# Patient Record
Sex: FEMALE | Race: WHITE
[De-identification: names, ages, dates, MRNs, and addresses within clinical notes are randomized per-mention and may not be internally consistent; named-entity substitution may affect disease eponyms.]

---

## 2017-03-08 ENCOUNTER — HOSPITAL ENCOUNTER (OUTPATIENT)
Dept: HOSPITAL 45 - C.LABPVFM | Age: 57
Discharge: HOME | End: 2017-03-08
Attending: INTERNAL MEDICINE
Payer: COMMERCIAL

## 2017-03-08 DIAGNOSIS — Z13.220: Primary | ICD-10-CM

## 2017-03-08 LAB
CHOLEST/HDLC SERPL: 2.9 {RATIO}
GLUCOSE UR QL: 53 MG/DL
KETONES UR QL STRIP: 92 MG/DL
NITRITE UR QL STRIP: 55 MG/DL (ref 0–150)
PH UR: 156 MG/DL (ref 0–200)
VERY LOW DENSITY LIPOPROT CALC: 11 MG/DL

## 2017-04-11 ENCOUNTER — HOSPITAL ENCOUNTER (OUTPATIENT)
Dept: HOSPITAL 45 - C.MAMM | Age: 57
Discharge: HOME | End: 2017-04-11
Attending: OBSTETRICS & GYNECOLOGY
Payer: COMMERCIAL

## 2017-04-11 DIAGNOSIS — Z12.31: Primary | ICD-10-CM

## 2017-04-12 NOTE — MAMMOGRAPHY REPORT
BILATERAL DIGITAL SCREENING MAMMOGRAM TOMOSYNTHESIS WITH CAD: 4/11/2017

CLINICAL HISTORY: Routine screening.  Patient has no complaints.  





TECHNIQUE:  Breast tomosynthesis in addition to standard 2D mammography was performed. Current study
 was also evaluated with a Computer Aided Detection (CAD) system.  



COMPARISON: Comparison is made to exams dated:  10/14/2016 mammogram, 10/12/2016 mammogram, 3/29/201
6 mammogram, 3/25/2015 mammogram, 1/23/2014 mammogram, and 4/6/2016 mammogram - [facility].   



BREAST COMPOSITION:  The tissue of both breasts is heterogeneously dense, which may obscure small ma
sses.  



FINDINGS: A new metallic biopsy marker is seen in the upper outer quadrant of the left breast, from 
recent benign stereotactic biopsy.  There are scattered and grouped stable microcalcifications throu
ghout the remainder of each breast which are stable compared to several prior mammograms.  No new river
spicious mass, architectural distortion or cluster of microcalcifications is seen.  



IMPRESSION:  ACR BI-RADS CATEGORY 1: NEGATIVE

There is no mammographic evidence of malignancy. A 1 year screening mammogram is recommended.  The p
atient will receive written notification of the results.  





Approximately 10% of breast cancers are not detected with mammography. A negative mammographic repor
t should not delay biopsy if a clinically suggestive mass is present.



Angelica Ponce M.D.          

ay/:4/12/2017 07:17:40  



Imaging Technologist: Kenyetta CURTIS)(KARL), [facility]

letter sent: Normal 1/2  

BI-RADS Code: ACR BI-RADS Category 1: Negative

## 2017-05-08 ENCOUNTER — HOSPITAL ENCOUNTER (OUTPATIENT)
Dept: HOSPITAL 45 - C.LABBC | Age: 57
Discharge: HOME | End: 2017-05-08
Attending: INTERNAL MEDICINE
Payer: COMMERCIAL

## 2017-05-08 DIAGNOSIS — R35.0: Primary | ICD-10-CM

## 2017-05-08 LAB
APPEARANCE UR: CLEAR
BILIRUB UR-MCNC: (no result) MG/DL
COLOR UR: YELLOW
MANUAL MICROSCOPIC REQUIRED?: NO
NITRITE UR QL STRIP: (no result)
PH UR STRIP: 7 [PH] (ref 4.5–7.5)
REVIEW REQ?: NO
SP GR UR STRIP: 1 (ref 1–1.03)
URINE BILL WITH OR WITHOUT MIC: (no result)
URINE EPITHELIAL CELL AUTO: (no result) /LPF (ref 0–5)
UROBILINOGEN UR-MCNC: (no result) MG/DL

## 2017-10-20 ENCOUNTER — HOSPITAL ENCOUNTER (OUTPATIENT)
Dept: HOSPITAL 45 - C.PAPS | Age: 57
Discharge: HOME | End: 2017-10-20
Attending: OBSTETRICS & GYNECOLOGY
Payer: COMMERCIAL

## 2017-10-20 DIAGNOSIS — Z01.419: Primary | ICD-10-CM

## 2017-10-20 DIAGNOSIS — Z78.0: ICD-10-CM

## 2018-03-15 ENCOUNTER — HOSPITAL ENCOUNTER (OUTPATIENT)
Dept: HOSPITAL 45 - C.CTS | Age: 58
Discharge: HOME | End: 2018-03-15
Attending: INTERNAL MEDICINE
Payer: COMMERCIAL

## 2018-03-15 DIAGNOSIS — R91.8: Primary | ICD-10-CM

## 2018-03-15 NOTE — DIAGNOSTIC IMAGING REPORT
CT OF THE CHEST WITHOUT IV CONTRAST



CLINICAL HISTORY: Follow up lung mass.    



COMPARISON STUDY:  Chest CT January 20, 2014 and January 12, 2016 and PET CT

January 27, 2014. 



CT DOSE: 216.77 mGy.cm



TECHNIQUE:  Axial images of the chest were obtained without IV contrast.  Images

were reviewed in the axial, sagittal, and coronal planes. IV contrast was not

administered for this examination.  A dose lowering technique was utilized

adhering to the principles of ALARA.





FINDINGS:  No enlarged axillary, mediastinal or hilar lymph nodes are present.

The size of the heart is normal. There is no pericardial effusion. A mass-like

4.4 x 1.7 x 3.9 cm abnormality within the superior segment of the left lower

lobe has mildly increased in size since exam of January 12, 2016 when it

measured 3.8 x 1.4 x 3.6 cm. This contains minimal linear calcification.

Irregular adjacent density is unchanged. Additional smaller pulmonary nodules

are unchanged since initial CT of January 20, 2014 and include a 7 mm left lower

lobe nodule shown on image [id number], a final mid right lower lobe nodule shown

image 205 and a 7 mm right lower lobe nodule shown image 145. There are no new

pulmonary nodules. There is no pneumothorax or pleural effusion. No suspicious

osseous lesions are present. Upper abdomen is unremarkable. 



IMPRESSION:  



1. Mild increase in size of a 4.4 x 1.7 x 3.9 cm mass-like abnormality within

the superior segment of the left lower lobe since chest CT of January 12, 2016.

This is indeterminate however a slow-growing neoplasm is within the

differential. If not already performed, pulmonary consultation for consideration

for tissue sampling is recommended.



2. No change in multiple additional left lower lobe pulmonary nodules since

initial chest CT.



3. No thoracic lymphadenopathy.







Electronically signed by:  Vineet Hector M.D.

3/15/2018 9:39 AM



Dictated Date/Time:  3/15/2018 8:18 AM

## 2018-04-17 ENCOUNTER — HOSPITAL ENCOUNTER (OUTPATIENT)
Dept: HOSPITAL 45 - C.MAMM | Age: 58
Discharge: HOME | End: 2018-04-17
Attending: OBSTETRICS & GYNECOLOGY
Payer: COMMERCIAL

## 2018-04-17 DIAGNOSIS — Z12.31: Primary | ICD-10-CM

## 2018-04-18 NOTE — MAMMOGRAPHY REPORT
BILATERAL DIGITAL SCREENING MAMMOGRAM TOMOSYNTHESIS WITH CAD: 4/17/2018

CLINICAL HISTORY: Routine screening.  Patient has no complaints.  





TECHNIQUE:  Breast tomosynthesis in addition to standard 2D mammography was performed. Current study 
was also evaluated with a Computer Aided Detection (CAD) system.  



COMPARISON: Comparison is made to exams dated:  4/11/2017 mammogram, 4/6/2016 mammogram, 3/29/2016 ma
mmogram, 3/25/2015 mammogram, 1/23/2014 mammogram, and 1/22/2013 mammogram - [facility]
nter.   



BREAST COMPOSITION:  The tissue of both breasts is heterogeneously dense, which may obscure small mas
ses.  



FINDINGS: There is a stable biopsy marker clip in the upper outer quadrant of the left breast.  Diffu
se punctate microcalcifications.  No new suspicious mass, architectural distortion or cluster of micr
ocalcifications is seen.  



IMPRESSION:  ACR BI-RADS CATEGORY 1: NEGATIVE

There is no mammographic evidence of malignancy. A 1 year screening mammogram is recommended.  The pa
tient will receive written notification of the results.  





Approximately 10% of breast cancers are not detected with mammography. A negative mammographic report
 should not delay biopsy if a clinically suggestive mass is present.



Angelica Ponce M.D.          

ay/:4/17/2018 16:23:27  



Imaging Technologist: Karis CURTIS)(M), [facility]

letter sent: Normal 1/2  

BI-RADS Code: ACR BI-RADS Category 1: Negative

## 2018-05-03 ENCOUNTER — HOSPITAL ENCOUNTER (OUTPATIENT)
Dept: HOSPITAL 45 - C.LAB | Age: 58
Discharge: HOME | End: 2018-05-03
Attending: INTERNAL MEDICINE
Payer: COMMERCIAL

## 2018-05-03 DIAGNOSIS — R39.9: Primary | ICD-10-CM
